# Patient Record
Sex: FEMALE | Race: BLACK OR AFRICAN AMERICAN | NOT HISPANIC OR LATINO | Employment: FULL TIME | URBAN - NONMETROPOLITAN AREA
[De-identification: names, ages, dates, MRNs, and addresses within clinical notes are randomized per-mention and may not be internally consistent; named-entity substitution may affect disease eponyms.]

---

## 2021-11-03 ENCOUNTER — NON-PROVIDER VISIT (OUTPATIENT)
Dept: URGENT CARE | Facility: PHYSICIAN GROUP | Age: 21
End: 2021-11-03

## 2021-11-03 DIAGNOSIS — Z11.1 ENCOUNTER FOR PPD TEST: ICD-10-CM

## 2021-11-03 NOTE — NON-PROVIDER
Emani Rodriguez is a 21 y.o. female here for a non-provider visit for PPD placement -- Step 1 of 2    Reason for PPD:  work requirement    1. TB evaluation questionnaire completed by patient? Yes      -  If any answers marked yes did you contact a provider prior to placing? No  2.  Patient notified to return to clinic for reading on: 11/5-11/6  3.  PPD Placement documentation completed on TB evaluation questionnaire? Yes  4.  Location of TB evaluation questionnaire filed:

## 2021-11-06 ENCOUNTER — NON-PROVIDER VISIT (OUTPATIENT)
Dept: URGENT CARE | Facility: PHYSICIAN GROUP | Age: 21
End: 2021-11-06

## 2021-11-06 NOTE — NON-PROVIDER
Emani Rodriguez is a 21 y.o. female here for a non-provider visit for PPD reading -- Step 1 of 2.      1.  Resulted in Epic under enter/edit results? Yes   2.  TB evaluation questionnaire scanned into chart and original given to patient?Yes      3. Was induration greater than 0 mm? No.    If Step 1 of 2, when is patient returning for second step (delete if N/A): yes      Routed to PCP? No

## 2021-11-12 ENCOUNTER — NON-PROVIDER VISIT (OUTPATIENT)
Dept: URGENT CARE | Facility: PHYSICIAN GROUP | Age: 21
End: 2021-11-12

## 2021-11-13 NOTE — NON-PROVIDER
Emani Rodriguez is a 21 y.o. female here for a non-provider visit for PPD placement -- Step 2 of 2    Reason for PPD:  work requirement    1. TB evaluation questionnaire completed by patient? Yes      -  If any answers marked yes did you contact a provider prior to placing? No  2.  Patient notified to return to clinic for reading on: 11/15/2021  3.  PPD Placement documentation completed on TB evaluation questionnaire? Yes  4.  Location of TB evaluation questionnaire filed: folder

## 2021-11-15 ENCOUNTER — NON-PROVIDER VISIT (OUTPATIENT)
Dept: URGENT CARE | Facility: PHYSICIAN GROUP | Age: 21
End: 2021-11-15

## 2021-11-15 NOTE — NON-PROVIDER
Emani Rodriguez is a 21 y.o. female here for a non-provider visit for PPD reading -- Step 2 of 2.      1.  Resulted in Epic under enter/edit results? Yes   2.  TB evaluation questionnaire scanned into chart and original given to patient?Yes      3. Was induration greater than 0 mm? No.    If Step 1 of 2, when is patient returning for second step (delete if N/A): n/a     Routed to PCP? No

## 2023-09-08 ENCOUNTER — OFFICE VISIT (OUTPATIENT)
Dept: MEDICAL GROUP | Facility: CLINIC | Age: 23
End: 2023-09-08
Payer: MEDICAID

## 2023-09-08 VITALS
BODY MASS INDEX: 30.82 KG/M2 | WEIGHT: 185 LBS | HEIGHT: 65 IN | TEMPERATURE: 97.6 F | SYSTOLIC BLOOD PRESSURE: 120 MMHG | OXYGEN SATURATION: 97 % | DIASTOLIC BLOOD PRESSURE: 74 MMHG | HEART RATE: 88 BPM

## 2023-09-08 DIAGNOSIS — Z34.91 PREGNANT AND NOT YET DELIVERED IN FIRST TRIMESTER: ICD-10-CM

## 2023-09-08 DIAGNOSIS — Z32.00 POSSIBLE PREGNANCY: Primary | ICD-10-CM

## 2023-09-08 LAB
POCT INT CON NEG: NEGATIVE
POCT INT CON POS: POSITIVE
POCT URINE PREGNANCY TEST: POSITIVE

## 2023-09-08 PROCEDURE — 3078F DIAST BP <80 MM HG: CPT

## 2023-09-08 PROCEDURE — 81025 URINE PREGNANCY TEST: CPT

## 2023-09-08 PROCEDURE — 99213 OFFICE O/P EST LOW 20 MIN: CPT | Mod: GE

## 2023-09-08 PROCEDURE — 3074F SYST BP LT 130 MM HG: CPT

## 2023-09-08 ASSESSMENT — PATIENT HEALTH QUESTIONNAIRE - PHQ9: CLINICAL INTERPRETATION OF PHQ2 SCORE: 0

## 2023-09-20 ENCOUNTER — APPOINTMENT (OUTPATIENT)
Dept: RADIOLOGY | Facility: IMAGING CENTER | Age: 23
End: 2023-09-20
Payer: MEDICAID

## 2023-09-20 DIAGNOSIS — Z3A.10 10 WEEKS GESTATION OF PREGNANCY: ICD-10-CM

## 2023-09-20 PROCEDURE — 76801 OB US < 14 WKS SINGLE FETUS: CPT | Mod: TC

## 2023-09-25 PROBLEM — Z32.00 POSSIBLE PREGNANCY: Status: ACTIVE | Noted: 2023-09-25

## 2023-09-25 NOTE — ASSESSMENT & PLAN NOTE
- Bedside ultrasound unable to visualize gestational sac. No transvaginal capability in office.  - Discussed with patient that 5-6 weeks gestation is very difficult to visualize pregnancy  - Pt's bladder empty    - Prenatal panel ordered  - OBGYN referral placed. However, pt counseled she does not need referral to OBGYN and she should call offices she wants to go to.  - Counseled on prenatal vitamins, precautions for n/v, and avoiding ETOH, drugs, deli meats, undercooked meats, unpasteurized cheese, raw fish.

## 2023-09-25 NOTE — PROGRESS NOTES
"Subjective:     CC: Possible pregnancy    HPI:   Emani presents today with    Problem   Possible Pregnancy    - Outpatient and hospital b-hcg positive.  - LMP first week of August         No current Epic-ordered outpatient medications on file.     No current Epic-ordered facility-administered medications on file.       ROS:  As above      Objective:     Exam:  /74   Pulse 88   Temp 36.4 °C (97.6 °F)   Ht 1.651 m (5' 5\")   Wt 83.9 kg (185 lb)   SpO2 97%   BMI 30.79 kg/m²  Body mass index is 30.79 kg/m².    Gen: Alert and oriented, No apparent distress.  HEENT: PERRL, EOMI, NCAT, uvula midline, no exudates  Neck: Neck is supple without lymphadenopathy.  Lungs: Normal effort, CTA bilaterally, no wheezes, rhonchi, or rales  CV: Regular rate and rhythm. No murmurs, rubs, or gallops.  Abd:  Soft, Non-distended, non-tender  Ext: No clubbing, cyanosis, edema.  Skin: No rashes, no erythema      Labs: Ordered    Assessment & Plan:     23 y.o. female with the following:     Problem List Items Addressed This Visit       Possible pregnancy - Primary     - Bedside ultrasound unable to visualize gestational sac. No transvaginal capability in office.  - Discussed with patient that 5-6 weeks gestation is very difficult to visualize pregnancy  - Pt's bladder empty    - Prenatal panel ordered  - OBGYN referral placed. However, pt counseled she does not need referral to OBGYN and she should call offices she wants to go to.  - Counseled on prenatal vitamins, precautions for n/v, and avoiding ETOH, drugs, deli meats, undercooked meats, unpasteurized cheese, raw fish.         Relevant Orders    POCT Pregnancy (Completed)    PREG CNTR PRENATAL PN    HEP C VIRUS ANTIBODY     Other Visit Diagnoses       Pregnant and not yet delivered in first trimester        Relevant Orders    PREG CNTR PRENATAL PN    HEP C VIRUS ANTIBODY    Referral to OB/Gyn                No follow-ups on file.            "

## 2024-02-22 ENCOUNTER — HOSPITAL ENCOUNTER (EMERGENCY)
Facility: MEDICAL CENTER | Age: 24
End: 2024-02-22
Attending: OBSTETRICS & GYNECOLOGY | Admitting: OBSTETRICS & GYNECOLOGY
Payer: MEDICAID

## 2024-02-22 VITALS
WEIGHT: 200 LBS | BODY MASS INDEX: 32.14 KG/M2 | RESPIRATION RATE: 16 BRPM | SYSTOLIC BLOOD PRESSURE: 115 MMHG | HEIGHT: 66 IN | DIASTOLIC BLOOD PRESSURE: 68 MMHG | TEMPERATURE: 97.1 F | OXYGEN SATURATION: 97 % | HEART RATE: 80 BPM

## 2024-02-22 LAB
ALBUMIN SERPL BCP-MCNC: 3.7 G/DL (ref 3.2–4.9)
ALBUMIN/GLOB SERPL: 1.2 G/DL
ALP SERPL-CCNC: 81 U/L (ref 30–99)
ALT SERPL-CCNC: 16 U/L (ref 2–50)
ANION GAP SERPL CALC-SCNC: 14 MMOL/L (ref 7–16)
APPEARANCE UR: CLEAR
AST SERPL-CCNC: 14 U/L (ref 12–45)
BASOPHILS # BLD AUTO: 0.3 % (ref 0–1.8)
BASOPHILS # BLD: 0.03 K/UL (ref 0–0.12)
BILIRUB SERPL-MCNC: 0.3 MG/DL (ref 0.1–1.5)
BUN SERPL-MCNC: 5 MG/DL (ref 8–22)
CALCIUM ALBUM COR SERPL-MCNC: 9.2 MG/DL (ref 8.5–10.5)
CALCIUM SERPL-MCNC: 9 MG/DL (ref 8.5–10.5)
CHLORIDE SERPL-SCNC: 102 MMOL/L (ref 96–112)
CO2 SERPL-SCNC: 20 MMOL/L (ref 20–33)
COLOR UR AUTO: YELLOW
CREAT SERPL-MCNC: 0.36 MG/DL (ref 0.5–1.4)
CREAT UR-MCNC: 47.29 MG/DL
EOSINOPHIL # BLD AUTO: 0.12 K/UL (ref 0–0.51)
EOSINOPHIL NFR BLD: 1.1 % (ref 0–6.9)
ERYTHROCYTE [DISTWIDTH] IN BLOOD BY AUTOMATED COUNT: 39.1 FL (ref 35.9–50)
GFR SERPLBLD CREATININE-BSD FMLA CKD-EPI: 143 ML/MIN/1.73 M 2
GLOBULIN SER CALC-MCNC: 3.1 G/DL (ref 1.9–3.5)
GLUCOSE SERPL-MCNC: 72 MG/DL (ref 65–99)
GLUCOSE UR QL STRIP.AUTO: NEGATIVE MG/DL
HCT VFR BLD AUTO: 36.2 % (ref 37–47)
HGB BLD-MCNC: 12.3 G/DL (ref 12–16)
IMM GRANULOCYTES # BLD AUTO: 0.08 K/UL (ref 0–0.11)
IMM GRANULOCYTES NFR BLD AUTO: 0.7 % (ref 0–0.9)
KETONES UR QL STRIP.AUTO: NEGATIVE MG/DL
LEUKOCYTE ESTERASE UR QL STRIP.AUTO: NEGATIVE
LYMPHOCYTES # BLD AUTO: 2.19 K/UL (ref 1–4.8)
LYMPHOCYTES NFR BLD: 19.9 % (ref 22–41)
MCH RBC QN AUTO: 27.7 PG (ref 27–33)
MCHC RBC AUTO-ENTMCNC: 34 G/DL (ref 32.2–35.5)
MCV RBC AUTO: 81.5 FL (ref 81.4–97.8)
MONOCYTES # BLD AUTO: 0.58 K/UL (ref 0–0.85)
MONOCYTES NFR BLD AUTO: 5.3 % (ref 0–13.4)
NEUTROPHILS # BLD AUTO: 7.99 K/UL (ref 1.82–7.42)
NEUTROPHILS NFR BLD: 72.7 % (ref 44–72)
NITRITE UR QL STRIP.AUTO: NEGATIVE
NRBC # BLD AUTO: 0 K/UL
NRBC BLD-RTO: 0 /100 WBC (ref 0–0.2)
PH UR STRIP.AUTO: 7 [PH] (ref 5–8)
PLATELET # BLD AUTO: 299 K/UL (ref 164–446)
PMV BLD AUTO: 11.4 FL (ref 9–12.9)
POTASSIUM SERPL-SCNC: 3.7 MMOL/L (ref 3.6–5.5)
PROT SERPL-MCNC: 6.8 G/DL (ref 6–8.2)
PROT UR QL STRIP: NEGATIVE MG/DL
PROT UR-MCNC: 7 MG/DL (ref 0–15)
PROT/CREAT UR: 148 MG/G (ref 10–107)
RBC # BLD AUTO: 4.44 M/UL (ref 4.2–5.4)
RBC UR QL AUTO: NEGATIVE
SODIUM SERPL-SCNC: 136 MMOL/L (ref 135–145)
SP GR UR STRIP.AUTO: 1.01 (ref 1–1.03)
WBC # BLD AUTO: 11 K/UL (ref 4.8–10.8)

## 2024-02-22 PROCEDURE — 85025 COMPLETE CBC W/AUTO DIFF WBC: CPT

## 2024-02-22 PROCEDURE — 84156 ASSAY OF PROTEIN URINE: CPT

## 2024-02-22 PROCEDURE — 99283 EMERGENCY DEPT VISIT LOW MDM: CPT

## 2024-02-22 PROCEDURE — 81002 URINALYSIS NONAUTO W/O SCOPE: CPT

## 2024-02-22 PROCEDURE — 59025 FETAL NON-STRESS TEST: CPT

## 2024-02-22 PROCEDURE — 82570 ASSAY OF URINE CREATININE: CPT

## 2024-02-22 PROCEDURE — 80053 COMPREHEN METABOLIC PANEL: CPT

## 2024-02-22 PROCEDURE — 36415 COLL VENOUS BLD VENIPUNCTURE: CPT

## 2024-02-22 ASSESSMENT — PAIN SCALES - GENERAL: PAINLEVEL: 0 - NO PAIN

## 2024-02-22 NOTE — ED PROVIDER NOTES
Obstetrics and Gynecology  Obstetric Emergency Department Assessment Note    ID: 24 y.o. is a  with IUP at 29wks    Primary Obstetrician: Rebecca Heller M.D.    Assessing Obstetrician: Caroline Yancey MD    S: Pt sent from office for PIH workup due to elevated blood pressure of 130s/90s. She has had a couple elevated blood pressures in the past as well. Denies hx of HTN prior to pregnancy. No HA, RUQ pain, or visual changes. No swelling. No CTX, VB, or LOF. Baby moving well. Her pregnancy has recently been complicated by IUGR. She is seeing [facility] for this.    ROS: 10 systems negative except as noted above.    O:   Vitals:    24 1201 24 1216 24 1231 24 1246   BP: 113/69 121/76 135/75 121/65   Pulse: 85 100 89 82   Resp:       Temp:       TempSrc:       SpO2:       Weight:       Height:           Gen: NAD, AAO  HEENT: NC/AT, MMM  Neck: supple  Abd: Gravid, soft, uterus NTTP, no rebound/guarding  Ext: WWP, 2+ DPP, no edema  Skin: no rash  Neuro: no gross deficits, EOMI, no clonus  Psy: mood good, affect normal and congruent  Pelvic: SVE deferred    NST Report Review:  NST: 140s to 150s, pos accels, neg decels, moderate variability, reactive and category 1  [place]: none    Recent Labs     24  1203   WBC 11.0*   RBC 4.44   HEMOGLOBIN 12.3   HEMATOCRIT 36.2*   MCV 81.5   MCH 27.7   RDW 39.1   PLATELETCT 299   MPV 11.4   NEUTSPOLYS 72.70*   LYMPHOCYTES 19.90*   MONOCYTES 5.30   EOSINOPHILS 1.10   BASOPHILS 0.30     Recent Labs     24  1203   SODIUM 136   POTASSIUM 3.7   CHLORIDE 102   CO2 20   GLUCOSE 72   BUN 5*     Recent Labs     24  1203   ALBUMIN 3.7   TBILIRUBIN 0.3   ALKPHOSPHAT 81   TOTPROTEIN 6.8   ALTSGPT 16   ASTSGOT 14   CREATININE 0.36*      Latest Reference Range & Units 24 11:49   Creatinine, Random Urine mg/dL 47.29   Total Protein, Urine 0.0 - 15.0 mg/dL 7.0   Protein Creatinine Ratio 10 - 107 mg/g 148 (H)   (H): Data is abnormally high      Assessment:    Emani Rodriguez is a 24 y.o.  at 29wks  Gestational HTN - no evidence of severe PIH at this time, no proteinuria, PIH labs all WNL, asymptomatic, all BP WNL in triage.  Reassuring, reactive NST.  Fetal IUGR    Plan:  Pt discharged to home.  FKC/labor precautions  PIH precautions  F/u with Dr. Heller and [facility] as scheduled.  Check BP daily, log and bring to appts.  Return prn concerns.      Caroline Yancey M.D., 2024, 11:32 AM

## 2024-02-22 NOTE — PROGRESS NOTES
Patient is a  with EDC of 2024 at 29 weeks and 6 days who presents to L&D after being sent over from office for PIH work up. External monitors applied, VS obtained. Dr. Bravo at bedside. Orders received from Dr. Bravo. Patient reports +FM, denies LOF, VB and UC. Patient denies complications with pregnancy.   1300: Report to SHNAON Small.  1330: Report from SHANON Small.  1343: Orders received from Dr. Bravo to discharge patient.  1350: Discharge education discussed and work note provided to patient. Patient verbalized understanding, denies questions and concerns at this time. Patient ambulated off unit in stable condition with her significant other. Care relinquished.

## 2024-02-28 ENCOUNTER — HOSPITAL ENCOUNTER (EMERGENCY)
Facility: MEDICAL CENTER | Age: 24
End: 2024-02-28
Attending: OBSTETRICS & GYNECOLOGY | Admitting: OBSTETRICS & GYNECOLOGY
Payer: MEDICAID

## 2024-02-28 VITALS
HEIGHT: 66 IN | DIASTOLIC BLOOD PRESSURE: 72 MMHG | OXYGEN SATURATION: 97 % | TEMPERATURE: 97.2 F | HEART RATE: 85 BPM | SYSTOLIC BLOOD PRESSURE: 113 MMHG | WEIGHT: 205 LBS | BODY MASS INDEX: 32.95 KG/M2 | RESPIRATION RATE: 16 BRPM

## 2024-02-28 LAB
APPEARANCE UR: CLEAR
COLOR UR AUTO: YELLOW
GLUCOSE UR QL STRIP.AUTO: NEGATIVE MG/DL
KETONES UR QL STRIP.AUTO: NEGATIVE MG/DL
LEUKOCYTE ESTERASE UR QL STRIP.AUTO: NEGATIVE
NITRITE UR QL STRIP.AUTO: NEGATIVE
PH UR STRIP.AUTO: 6.5 [PH] (ref 5–8)
PROT UR QL STRIP: NEGATIVE MG/DL
RBC UR QL AUTO: ABNORMAL
SP GR UR STRIP.AUTO: 1.02 (ref 1–1.03)

## 2024-02-28 PROCEDURE — 59025 FETAL NON-STRESS TEST: CPT

## 2024-02-28 PROCEDURE — 302449 STATCHG TRIAGE ONLY (STATISTIC)

## 2024-02-28 PROCEDURE — 81002 URINALYSIS NONAUTO W/O SCOPE: CPT

## 2024-02-28 ASSESSMENT — FIBROSIS 4 INDEX: FIB4 SCORE: 0.3

## 2024-02-29 NOTE — PROGRESS NOTES
24 y.o.  EDC  (30.5 wks)    Pt presents to L&D c/o two elevated Bps while at work. Pt states her pressures then were 140s/90s. Pt states seeing some floaters shortly earlier today but denies any vision changes now. Denies any HA or epigastric pain. Reports good FM. Denies VB/LOF.  EFM/TOCO applied, VSS.    : Dr Muñoz notified of pt's arrival and current BP's. Discharge home orders received after reactive NST.    : Patient discharged home with specific instruction to return to L&D/Physician ie.. Bleeding/ROM/decreased FM/labor/concerns for self or baby.  Patient denies questions or concerns regarding POC since arrival and POC to discharge home.  Patient ambulated out of hospital.

## 2024-05-16 ENCOUNTER — OFFICE VISIT (OUTPATIENT)
Dept: OBGYN | Facility: CLINIC | Age: 24
End: 2024-05-16
Payer: MEDICAID

## 2024-05-16 DIAGNOSIS — O92.79 LACTATION DISORDER, POSTPARTUM CONDITION: ICD-10-CM

## 2024-05-16 PROCEDURE — 99215 OFFICE O/P EST HI 40 MIN: CPT | Performed by: NURSE PRACTITIONER

## 2024-05-16 NOTE — PROGRESS NOTES
Summary: 37.1 weeks and IUGR, delivery at [facility], supplementation and pumping started but pumping painful.  Figured out the flanges, switched to her [employer] personal and [employer] Stride wearable and making sufficient milk based on growth.  She did find she needed to use 4oz of formula each of the last two days. Takes 3-4oz at a feeding.  Would like to breastfeed and tried recently, he stayed latched but it hurt the whole 30 minutes he was on the one breast. Has a firmer left breast with discomfort under the left breast, feeling full.  Fed 70ml before driving to appointment.  Today: Assisted latch to the left breast, awkward cross cradle so moved to football position, finally got it without pain and he  for 12 minutes removing 1.5oz. To the right breast, football again and removed another 1.6oz + as he had a blow out on the blanket. Pumping not indicated, leaving it for the after the next feeding.  Plan: Practice breastfeeding in the day, you will struggle sometimes, so just stop and feed a bottle then pump, you are both learning.Each day you will be breastfeeding more frequently and then when it feels you are ready you can introduce breastfeeding at night. He will probably want to feed more often as he may not be taking as much milk each time.  All pumped milk is for him within the next 24 hours, not freezing any at this time.  Follow up:   Lactation appointment: Group Encouraged and 2 weeks  Baby 's Provider appointment: 2 Month Well Child Check   Referrals: None    Maternal Diagnosis/Problem:  Lactation Disorder- Baby not latching   Infant Diagnosis/Problem:   difficulties feeding at the breast     Subjective:     Emani Rodriguez is a 24 y.o. female here for lactation care. She is here today with her baby.    Concerns:   Maternal: Latch on difficulties , Feeling that there is not enough milk , Weight check, Infant feeding evaluation, and Breastfeeding questions   Infant: Baby not interested    HPI:    Pertinent  history:  37.1weeks    Mother does not have a history of advanced maternal age, GDM, hypertension prior to pregnancy, insulin resistance, multiple gestation, PCOS, thyroid disease, auto immune disease , placenta encapsulation, and breast surgery    Mother does have GHTN. Common condition(s) that may interfere in milk supply.    Breast changes in pregnancy: Yes  Breast surgeries: No    FEEDING HISTORY:    Previous Breastfeeding History: First baby.   Hospital Course: 37.1 weeks and IUGR, delivery at [facility], supplementation and pumping started but pumping painful.    Currently 2024: Figured out the flanges, switched to her Lansin[employer] personal and amanda Stride wearable and making sufficient milk based on growth.  She did find she needed to use 4oz of formula each of the last two days. Takes 3-4oz at a feeding.  Would like to breastfeed and tried recently, he stayed latched but it hurt the whole 30 minutes he was on the one breast. Has a firmer left breast with discomfort under the left breast, feeling full.  Fed 70ml before driving to appointment.     Both breasts: Tries    Supplement: Supplementation initiated inpatient, Expressed breast milk, and Formula    Breast Pumping:   Frequency: 6-8x/day  Quantity Obtained: varies  Type of Pump: Wearable Amanda stride and Lansinoh  Flange size/type:adjusted  NO pain with pumping    Maternal ROS:  Constitutional: No fever, chills. Feeling well  Breasts: No soreness of nipples and Soreness of breasts on the way to appointment  Psychiatric: No mood changes  Mental Health: No mention of feeling irritable, agitated, angry, overwhelmed, apathetic, appetite changes, exhausted nor having sleep changes outside infant feeds/demands.  No current outpatient medications on file prior to visit.     No current facility-administered medications on file prior to visit.      No past medical history on file.      Objective:     Maternal Physical Lactation  Exam  General: No acute distress  Breasts: Symmetrical , Soft, Plugged Duct/inflammation - below the left breast, no redness no warmth after feeding not totally emptied , and Mastitis  - no S/S  Nipples: intact  Psychiatric: Normal mood and affect. Her behavior is normal. Judgment and thought content normal.  Mental Health: Did NOT exhibit sadness, crying, feeling overwhelmed, agitation or hypervigilance.    Assessment/Plan & Lactation Counseling:     Infant Exam on Infant Chart    Infant Weight History:   4/13/24   4#12oz  4/17/24  4#14.1oz  5/8/24   7#2.5oz  5/16/2024  7#14.0oz    Infant Intake at Breast:      Total: 3.1oz +  Milk Transfer at this feeding:   Effective breastfeeding   Pumped: Not indicated   Initiation of Feeding: Infant initiates  Attachment Achieved: with difficulty  Nipple shield: N/A       Difficult Latch Due To:   Position and latch  Learning after 5 weeks of bottles only but he is a fast learner like his mom  Suck Pattern at the Breast: Suck burst and normal rest  Suck Pattern on the Bottle: Not Indicated   Behavior Following Observed Feeding: content  Nipple Pain: None     Latch: Assisted latch  Suckling/Feeding: attaches, baby fed effectively, baby roots, elicits DAVI, and rhythmic  Sucking strength: Moderate Strong  Sucking Rhythm Coordinated   Compression: WNL    Once latched, baby fell into a mature and fully integrated SSB pattern.    Swallowing No difficulty noted  If functional feeding, it is quiet, rhythmic, coordinated, organized, effeicent safe, satisfying and pleasurable for both parent and baby? learning  Milk Supply Available: normal    MATERNAL PERSONALIZED BREASTFEEDING PLAN  (Babies and parents change and adapt  or mal-adapt, to each other, so a plan today is only as good as it facilitates the families goals, follow up is key in a dynamic process as breastfeeding.)  Discussed concerns and symptoms as listed above in assessment and guidance summarized below. Shared decision  making was used between myself and the family for this encounter, home care, and follow up. Topics advised, taught and or reviewed included:   Breast Care: Plugs (a colloquial term for microscopic ductal inflammation and narrowing) Breast rest - No Massage,   KEY: Ice - 10 minutes  after feeding then every 30 minutes or as desired for repeatinthe ice. Don't put the ice directly on the skin.  Advil 800mg every 8 hours for 48 hours with food for pain  May add Tylenol 1000mg every 8 hours for 48 hours in between the Advil dosing if needed for more pain relief.  4th Trimester: The 12-week period immediately after mom has had the baby. Not everyone has heard of it, but every mother and their  baby will go through it. It is a time of great physical and emotional change as the baby adjusts to being outside the womb, and the family adjusts to new life as parents  During the fourth trimester, one can expect fussiness and crying from the baby and very likely exhaustion for the family.  babies are learning to adjust to life outside the womb where it was warm and squishy!  There is a lot of misinformation about babies and their needs, and parents are often encouraged to ignore baby's signals. Bad idea. Babies are “half-baked” at birth and have much to learn with the help of physical and emotional support from caregivers. Taking care of a baby's needs is an investment that pays off with a happier, healthier child and adult.  It can take weeks or even months for your body to feel totally normal again. There is a major hormonal upheaval experienced by moms in the first few weeks after birth, because their bodies are shifting from many pregnancy hormones to a more normal hormonal make-up.  These first three months with baby earth side is a delicate time. Honor it with a mindful dose of support. Mindful Mamma's is an lulu that may help.   Self-Compassion through Relational Support and Interaction.   Be kind to yourself.  "This is the first step in reducing anxiety and depression. Pay attention to how you are doing.   What do you need? Food, Rest, Sleep, Support, to Laugh/giggle: who will you ask today? They want to help you. We want to help you.   How do you stop your self-blame, or your self-criticism?   Get out of the house each day, walk or drive somewhere or ask a friend to drive you,  a \"treat\" at a drive through.   Mood and Anxiety Disorders: Having a new baby can be joyful time for some new moms, but a difficult time for others. For all, it is a time of profound physical and emotional change. Balancing baby, family, partners and friends, work, pets, and preexisting or new life stressors as well as sleep deprivation can be extremely challenging. Untreated depression, sleep exhaustion, and anxiety are each a challenge that must be addressed and in addition can contribute to early cessation of breastfeeding. It is important both for the mental health and physical health of new moms and babies to get on the path to feeling better and if therapeutic support is needed, specific resources are available.   Milk Supply is dependent on glandular tissue development, hormonal influences, how many times the baby removes milk and how well the breasts are emptied in a 24 hour period. This is a biological reality that we can NOT work around. If, for any reason, your baby is not latching, or you are not able to nurse, then it is important for you to remove the milk instead by pumping or hand expression.  There's no magic trick, tea, food, drink, cookie or supplement that will increase your milk supply. One must effectively remove milk to continue to make and maximize milk. In the early days and weeks that can be 8+ times in 24 hours. For older babies, on average 6-7 + times in 24 hours.    Hydration: Staying hydrated is important however lack of hydration is usually not a cause of significantly low milk production.  Everyone " needs a different amount of water, depending on their activity and diet. A high salt and/or high-protein diet, high physical activity, or very warm weather/sweating will require more fluids. A person eating a diet high in veggies and fruit, with a lack of physical activity will require fewer fluids. There is no magic number for the # of ounces of water each day.The best way to know that you are well hydrated is by looking at your urine.  Urine should look clear to light pale yellow, and you should need to pee at least every 3 to 4 hours unless you have a large bladder capacity.  Herbs and Medications: A galactagogue is an herb or medication taken by a breastfeeding mother to increase her milk supply. We know that for centuries mothers around the world have sought out remedies to increase their milk supply. However, there is limited research on the safety and effectiveness of herbal galactagogues, which makes it hard for us to endorse them. It is not known if any of these herbs are truly effective, and it is difficult to predict how a specific herbal galactagogue will affect an individual, requiring “trial and error” in many situations. When effective, results are generally seen within 24-72 hours of starting an herbal galactagogue. Many of these herbs are used to decrease high blood sugar. If you are diabetic or have problems with low blood sugar, or thyroid disease you may not be a candidate for herbs. Not all women can increase their low milk supply with a galactagogue due to the many underlying causes of low milk production.  When taking a galactagogue, remember that frequent milk removal is still the most effective way to increase supply.   Feeding:   Infant feeding well given current interval growth, guidelines to follow:  Feed your baby every 1.5-3 hours, more often if baby acts hungry.   8-12x/24hours, these will be irregular intervals  Responsive feedings - baby cues and parents respond  Awaken baby for  feeding if going over 3 hours in the day.    Given infants weight you may allow baby to go longer at night but that generally means shorter durations in the day.  Supplement:   No supplement is needed unless when not at the breast, mom  pumps and feeds  Pacifier Use:  The American Academy of Pediatrics' Position Paper reports: Although we recommend a conservative approach regarding pacifier use, we do not endorse a complete ban on the use of pacifiers, nor do we support an approach that induces parental guilt concerning their choices about the use of pacifiers. Pacifier use and breastfeeding in term and  newborns- a systematic review and meta-analysis from the  J of Pediatrics Published online 2022. Has found that when pacifiers are used among individuals who have been counseled on the risks, do not interfere with breastfeeding exclusivity or duration. This is a  parental choice. https://www.TestObject.com/watch?v=QspJS-4wqPc (One way to choose a pacifier)  Positioning Techniques for Bare Breast  Pillow used: Corrine 2 [place]  Suggested positions Cross cradle, Football, and Side lying  Fine tune position by making sure your fingers beneath the breast as well as your bra, are out of the way of your baby's chin.  Positioning: See http://globalhealthmedia.org/portfolio-items/positions-for-breastfeeding/?ptxtpalykUO=39081  Latch on Techniques for Bare Breast.   Aim is an asymmetric deep latch.  First make yourself comfortable. Sit with knees bent (unless lying down), feet on a stool if needed. You will support your baby, and pillows will be used to support YOU. You want your baby's belly facing your breast/body (belly to belly). If your baby is extremely fussy and crying, do skin-to-skin for a while until he or she calms down, then try (or try again).   Fine tune latch:  By holding your baby more securely at the breast, assisting your baby to stay attached by:  Support your baby with your hand  "behind the shoulder blades (NOT THE HEAD).  1. Align your baby's NOSE with your nipple  2. Your baby's chin should be the first part of his or her body to touch your breast  3. Tickle the baby's upper lip or nose with your nipple  4. When your baby's mouth is WIDE OPEN, swiftly bring your baby's mouth over your nipple/areola.  Steps 2 and 3 could be slightly reversed order or essentially happening at the same time.  Bringing your baby to your breast, not breast to the baby  Your baby's cheek to touch breast securely, nose tipped back  Hold your baby firmly in place so when your baby forgets to suck and picks it back up again your baby is in the correct spot. You will be extinguishing behavior and replacing it with a deeper latch to stimulate suck and provide satisfaction at the breast.  Your baby needs as much breast as deep in the mouth as possible to allow your nipples to heal and for you more importantly to maximize efficiency at the breast  If that doesn’t help, you should make an appointment with me to re-evaluate.   Latch is asymmetrical, leading with the chin, getting the baby below the breast, as if offering a large \"deli ronda sandwich\".  Here is a video from KULWANT on the asymmetric latch       https://www.youCloudHelixube.com/watch?v=0I-FDe7Jj01  Pumping Guidelines:  Both breasts at night until starting breastfeeding  In the daytime after every other breastfeeding or instead of breastfeeding.  Type of Pump:  Personal and Amanda stride  Power Pumping is rarely indicated as the response is not significant or zero over 24 hours  How long will vary woman to woman, typically 8-15 minutes, or 1-2 minutes after flow slows. Too long is dry pumping and creates inflammation  Flange Fit: Freely moving nipple in the tunnel with some movement of the areola. Careful using lubricant it will disguise pain if you increase suction  Caution with Breast Massage: The word “Massage” means different things in the breastfeeding world. It " is described and interpreted in so many different ways and unfortunately potentially harmful. The hands can be safe on a breast and  gentle to help in many ways but they also can be damaging when used in the wrong way.  Lymphatic drainage massage is appropriate,  open hand , lightly stroking only, and can be highly effective. The massage advice to knead , massage deeply , vibrate with marketed tools is  most concerning. Be gentle with this gland to not increase inflammation.   Storage (Acceptable guidelines for healthy term babies)  10 hours at room temp including your pieces, may rinse but not mandatory  4 days refrigerator, don't need  to refrigerate right away if using fresh pumped milk at the next feeding  Freezer 1 year  Deep freezer 2 years  American Academy or Pediatrics has said you may mix different temperature milks.   If baby drinks breast milk from a fresh or refrigerated bottle of breast milk,  you may return the unused portion to the refrigerator and use once at next feeding.   Answers to FAQs: https://www.infantrisk.com/category/breastfeeding  Alcohol & Breastfeeding: What's your time-to-zero?  Cough & Cold Medications while Breastfeeding  Vitamin D Supplementation and Breastfeeding  Antidepressant Use While Breastfeeding: What should I know?  Breastfeeding, Caffeine, and Energy Drinks  Recommended resources:  Physicians guide to breastfeeding, must up to date and accurate information available on breastfeeding. https://physicianguidetobreastfeeding.org/    Spoiler alert!  Parenting can be really hard. There is so much to learn when it comes to caring for small humans.  It can feel lonely and unsettling.  Our groups, are parenting and feeding support groups that's all about feeding/growing josiah.   Babies welcome.   Questions expected.   We will help you find your village!   Weight Checks  Breastfeeding [place] [facility]IVE  WEIGHT CHECKS  Tuesdays 10am - 11am. Women's Health at [facility], [address],  3rd floor conference room  Check your baby's weight, do a feeding and see how your baby is growing, visit with other mothers, plan on a walk or coffee date after group.  Please download the lulu: Growth: Baby and Child for Apple or Child Growth Tracker for Androids to chart and follow your baby's growth curve.  Due to space limitations - limit strollers please (New c/section moms please use your stroller).  We would love to have dads stay, but moms won't breastfeed if there are men in the room, sorry.  The room is generally scheduled for another event following group.  Please take all diapers with you   Online Breastfeeding [place] Online Group  Thursdays, 12 noon - 1 pm Facilitated by Everett MILLARD  Zoom Link: [email or web address]   Passcode [id number]   [id number]  Connect with other mothers:  Facebook:   [place] Breastfeeds: [email or web address]  Well-Nourished Babies (Private group for questions and support): [email or web address]  Online Breastfeeding [place] Online Group  Thursdays, 12 noon - 1 pm Facilitated by Eevrett MILLARD  Zoom Link: [email or web address]   Passcode [id number]   [id number]  Breastfeeding [facility] including opportunity to weight your baby and do before and after weights   Tuesdays 10am - 11am. Women's Health at [address] [place] [address], [address] [place] [address], 3rd floor conference room  Check your baby's weight, do a feeding and see how your baby is growing, visit with other mothers, plan on a walk or coffee date after group.  Please download Growth: Baby and Child for Apple or Child Growth Tracker for Androids if you would like to  document and follow your baby's growth curve.  Due to space limitations - limit strollers please (New c/section moms please use your stroller).  We would love to have dads stay, but moms won't  breastfeed if there are men in the room, sorry.  The room is generally scheduled for another event following group.  Please take all diapers with you   PSI ONLINE SUPPORT GROUPS  Postpartum [employer] International (PSI) support groups are conducted using a qgak-ny-kofo support model and are not intended for those experiencing a mental health crisis. Groups are 90 minutes (1.5 hours) in length. The first ~30 minutes is providing information, education, and establishing group guidelines. The next ~60 minutes is “talk time,” in which group members share and talk with each other. Group members must be present for the group guidelines before joining in the discussion or “talk time.”     Position, Latch and Pumping discussed and plan provided. (Documented on moms chart).     Infant Exam Summary:    Healthy 33 day old.  Anticipatory guidance was provided regarding feedings.   Weight  good interval growth:  Created a plan to meet family's breastfeeding goals.  Patient learning to breastfeed and needs practice now and opportunity      Contact Breastfeeding Medicine    or your Pediatrician for any of the following:   Decreased wet or poopy diapers  Decreased feeding  Baby not waking up for feeds on own most of time.   Irritability  Lethargy  Dry sticky mouth.   Any breastfeeding questions or concerns.    In Conclusion:   Managing breastfeeding and milk supply is very dynamic,can be a complex and intimate journey, and is not one size fits all. When obstacles present themselves, it takes confidence, persistence and support. The rights of the child include optimal nutrition and mothers need help to make informed decisions. You  and your baby have been screened for biological, psychological, and social risk factors that might interfere with achieving your goals.  Support is critical. We want the family thriving not just tolerating life. You are now the focus of our Breastfeeding Medicine team; we are here to support your decisions  and vision.     Follow up   Please call [phone] our voicemail line or the front office at [phone] to scheduled your next appointment.  Family is encouraged to e-mail (for Everett, may use [email or web address]) or [email or web address]  to update how the plan is working.     A total of 60 minutes, not including infant assessment time,  was spent preparing to see the patient, obtaining and reviewing separately obtained history, performing a medically appropriate examination and evaluation, counseling and educating the family, communicating with other health care professionals, documenting clinical information in the electronic health record, independently interpreting weighted feeds and infant growth results, communicating these results to the family and care coordination as detailed in the above note.       NEREIDA Luevano.

## 2025-02-25 ENCOUNTER — OFFICE VISIT (OUTPATIENT)
Dept: URGENT CARE | Facility: CLINIC | Age: 25
End: 2025-02-25
Payer: MEDICAID

## 2025-02-25 ENCOUNTER — APPOINTMENT (OUTPATIENT)
Dept: LAB | Facility: MEDICAL CENTER | Age: 25
End: 2025-02-25
Payer: MEDICAID

## 2025-02-25 VITALS
BODY MASS INDEX: 32.14 KG/M2 | WEIGHT: 200 LBS | DIASTOLIC BLOOD PRESSURE: 62 MMHG | HEIGHT: 66 IN | RESPIRATION RATE: 12 BRPM | SYSTOLIC BLOOD PRESSURE: 102 MMHG | TEMPERATURE: 98.4 F | OXYGEN SATURATION: 97 % | HEART RATE: 108 BPM

## 2025-02-25 DIAGNOSIS — B96.89 BACTERIAL URI: ICD-10-CM

## 2025-02-25 DIAGNOSIS — J06.9 BACTERIAL URI: ICD-10-CM

## 2025-02-25 PROCEDURE — 99213 OFFICE O/P EST LOW 20 MIN: CPT

## 2025-02-25 RX ORDER — DOXYCYCLINE HYCLATE 100 MG
100 TABLET ORAL 2 TIMES DAILY
Qty: 10 TABLET | Refills: 0 | Status: SHIPPED | OUTPATIENT
Start: 2025-02-25 | End: 2025-03-02

## 2025-02-25 RX ORDER — PREDNISONE 20 MG/1
40 TABLET ORAL DAILY
Qty: 10 TABLET | Refills: 0 | Status: SHIPPED | OUTPATIENT
Start: 2025-02-25 | End: 2025-03-02

## 2025-02-25 ASSESSMENT — ENCOUNTER SYMPTOMS
WHEEZING: 0
SORE THROAT: 0
HEADACHES: 1
CHILLS: 0
WEAKNESS: 0
DIARRHEA: 0
DIZZINESS: 0
SHORTNESS OF BREATH: 1
FEVER: 0
HEMOPTYSIS: 0
COUGH: 1
DOUBLE VISION: 0
BLURRED VISION: 0
MYALGIAS: 0
SPUTUM PRODUCTION: 1
PALPITATIONS: 0
NAUSEA: 0
TINGLING: 0
VOMITING: 0

## 2025-02-25 ASSESSMENT — FIBROSIS 4 INDEX: FIB4 SCORE: 0.32

## 2025-02-25 NOTE — PROGRESS NOTES
Subjective:   Emani Rodriguez is a 25 y.o. female who presents for Headache (Loss of voice.  SOB, concerns on Nuva ring placement)    Patient presents to the clinic for complaints of cough causing hoarse voice, SOB w/ exertion, fatigue x 2 weeks.  Cough has been intermittently productive.   Has taken OTC cough/cold medications and tylenol.   Patient denies fever, chills, SOB at rest or w/ minimal activity, weakness, N/V/D, or dizziness/lightheadedness.     Headache      Review of Systems   Constitutional:  Positive for malaise/fatigue. Negative for chills and fever.   HENT:  Negative for congestion, ear pain and sore throat.    Eyes:  Negative for blurred vision and double vision.   Respiratory:  Positive for cough, sputum production and shortness of breath (w/ exertion). Negative for hemoptysis and wheezing.    Cardiovascular:  Negative for chest pain and palpitations.   Gastrointestinal:  Negative for diarrhea, nausea and vomiting.   Genitourinary:  Negative for dysuria.   Musculoskeletal:  Negative for myalgias.   Neurological:  Positive for headaches. Negative for dizziness, tingling and weakness.   All other systems reviewed and are negative.    Refer to HPI for additional details.    During this visit, appropriate PPE was worn, and hand hygiene was performed.    PMH:  has no past medical history on file.    MEDS:   Current Outpatient Medications:     predniSONE (DELTASONE) 20 MG Tab, Take 2 Tablets by mouth every day for 5 days., Disp: 10 Tablet, Rfl: 0    doxycycline (VIBRAMYCIN) 100 MG Tab, Take 1 Tablet by mouth 2 times a day for 5 days., Disp: 10 Tablet, Rfl: 0    ALLERGIES:   Allergies   Allergen Reactions    Shellfish-Derived Products      SURGHX: History reviewed. No pertinent surgical history.  SOCHX:  reports that she has never smoked. She has never used smokeless tobacco.    FH: Per HPI as applicable/pertinent.    Medications, Allergies, and current problem list reviewed today in Epic.     Objective:  "    /62 (BP Location: Left arm, Patient Position: Sitting, BP Cuff Size: Large adult)   Pulse (!) 108   Temp 36.9 °C (98.4 °F) (Temporal)   Resp 12   Ht 1.676 m (5' 6\")   Wt 90.7 kg (200 lb)   SpO2 97%     Physical Exam  Constitutional:       Appearance: Normal appearance. She is not ill-appearing or toxic-appearing.   HENT:      Head: Normocephalic.      Right Ear: Tympanic membrane, ear canal and external ear normal.      Left Ear: Tympanic membrane, ear canal and external ear normal.      Nose: Nose normal. No congestion or rhinorrhea.      Mouth/Throat:      Mouth: Mucous membranes are moist.      Pharynx: Oropharynx is clear. No oropharyngeal exudate or posterior oropharyngeal erythema.   Eyes:      General:         Right eye: No discharge.         Left eye: No discharge.      Extraocular Movements: Extraocular movements intact.      Conjunctiva/sclera: Conjunctivae normal.      Pupils: Pupils are equal, round, and reactive to light.   Cardiovascular:      Rate and Rhythm: Normal rate and regular rhythm.      Pulses: Normal pulses.      Heart sounds: Normal heart sounds. No murmur heard.  Pulmonary:      Effort: Pulmonary effort is normal. No respiratory distress.      Breath sounds: Normal breath sounds. No wheezing or rhonchi.   Abdominal:      General: Abdomen is flat.   Musculoskeletal:         General: No signs of injury. Normal range of motion.      Cervical back: Normal range of motion. No rigidity.   Lymphadenopathy:      Cervical: No cervical adenopathy.   Skin:     General: Skin is warm and dry.   Neurological:      General: No focal deficit present.      Mental Status: She is alert and oriented to person, place, and time.      Motor: No weakness.         Assessment/Plan:     Diagnosis and associated orders:     1. Bacterial URI  - predniSONE (DELTASONE) 20 MG Tab; Take 2 Tablets by mouth every day for 5 days.  Dispense: 10 Tablet; Refill: 0  - doxycycline (VIBRAMYCIN) 100 MG Tab; Take 1 " Tablet by mouth 2 times a day for 5 days.  Dispense: 10 Tablet; Refill: 0     Comments/MDM:     Discussed that likely etiology of the patient's symptoms is acute bacterial upper respiratory infection.  Prednisone 5-day course and doxycycline 5-day course prescribed to the patient. Discussed proper administration and dosing as well as associated adverse/side effects of prescribed medications.  Advised patient to continue OTC pharmacologic and nonpharmacologic treatment of her symptoms, including but not limited to Tylenol, Motrin, OTC cough and cold medications, and plenty of rest and fluids.  Low likelihood for pneumonia as patient does not have fever, continuously productive cough, hypoxia, abnormalities in lung fields on auscultation, or any increase in respiratory labor or effort with respirations.  Patient agreeable to this plan of care.  All questions answered.  Return to clinic if symptoms persist or worsen.  Red flag symptoms warranting emergency medical services discussed, including but not limited to chest pain, SOB, wheezing, difficulty breathing or swallowing, sensation of throat closing or mass in the throat, severe intractable headache, changes to vision or hearing, palpitations or racing heart rate, abdominal pain, fever greater than 103F despite medication management, dizziness/lightheadedness/vertigo, or nausea/vomiting/diarrhea.           Differential diagnosis, natural history, supportive care, and indications for immediate follow-up discussed.    Advised the patient to follow-up with the primary care physician for recheck, reevaluation, and consideration of further management.    Instructed patient to seek emergency medical attention via calling EMS or going to the Emergency Room for red flag symptoms, including but not limited to: chest pain, palpitations, fever greater than 103F, shortness of breath, wheezing, new or worsened numbness/tingling, focal or unilateral weakness, and bloody  vomit/stool.     Please note that this dictation was created using voice recognition software. I have made a reasonable attempt to correct obvious errors, but I expect that there are errors of grammar and possibly content that I did not discover before finalizing the note.    This note was electronically signed by OSCAR Marcus

## 2025-04-08 ENCOUNTER — OFFICE VISIT (OUTPATIENT)
Dept: URGENT CARE | Facility: CLINIC | Age: 25
End: 2025-04-08
Payer: MEDICAID

## 2025-04-08 VITALS
OXYGEN SATURATION: 99 % | DIASTOLIC BLOOD PRESSURE: 78 MMHG | WEIGHT: 293 LBS | HEIGHT: 66 IN | HEART RATE: 107 BPM | TEMPERATURE: 97 F | RESPIRATION RATE: 18 BRPM | BODY MASS INDEX: 47.09 KG/M2 | SYSTOLIC BLOOD PRESSURE: 124 MMHG

## 2025-04-08 DIAGNOSIS — U07.1 INFECTION DUE TO 2019-NCOV: Primary | ICD-10-CM

## 2025-04-08 DIAGNOSIS — R68.89 FLU-LIKE SYMPTOMS: ICD-10-CM

## 2025-04-08 DIAGNOSIS — R05.1 ACUTE COUGH: ICD-10-CM

## 2025-04-08 LAB
FLUAV RNA SPEC QL NAA+PROBE: NEGATIVE
FLUBV RNA SPEC QL NAA+PROBE: NEGATIVE
RSV RNA SPEC QL NAA+PROBE: NEGATIVE
SARS-COV-2 RNA RESP QL NAA+PROBE: POSITIVE

## 2025-04-08 PROCEDURE — 99214 OFFICE O/P EST MOD 30 MIN: CPT

## 2025-04-08 PROCEDURE — 3074F SYST BP LT 130 MM HG: CPT

## 2025-04-08 PROCEDURE — 87637 SARSCOV2&INF A&B&RSV AMP PRB: CPT | Mod: QW

## 2025-04-08 PROCEDURE — 3078F DIAST BP <80 MM HG: CPT

## 2025-04-08 RX ORDER — ETONOGESTREL AND ETHINYL ESTRADIOL VAGINAL .015; .12 MG/D; MG/D
RING VAGINAL
COMMUNITY
Start: 2025-02-11

## 2025-04-08 RX ORDER — HYDROXYZINE HYDROCHLORIDE 10 MG/1
10 TABLET, FILM COATED ORAL 3 TIMES DAILY PRN
COMMUNITY

## 2025-04-08 RX ORDER — BENZONATATE 100 MG/1
100 CAPSULE ORAL 3 TIMES DAILY PRN
Qty: 60 CAPSULE | Refills: 0 | Status: SHIPPED | OUTPATIENT
Start: 2025-04-08

## 2025-04-08 ASSESSMENT — ENCOUNTER SYMPTOMS
NAUSEA: 0
WHEEZING: 0
HEMOPTYSIS: 0
SORE THROAT: 1
HEARTBURN: 0
MYALGIAS: 1
SPUTUM PRODUCTION: 1
DIZZINESS: 0
DIAPHORESIS: 0
PHOTOPHOBIA: 0
DOUBLE VISION: 0
CHILLS: 1
HEADACHES: 1
COUGH: 1
PALPITATIONS: 0
EYE DISCHARGE: 0
DIARRHEA: 0
FEVER: 1
EYE REDNESS: 0
ABDOMINAL PAIN: 0
VOMITING: 0
BRUISES/BLEEDS EASILY: 0
SHORTNESS OF BREATH: 0
BLURRED VISION: 0
DEPRESSION: 0
FOCAL WEAKNESS: 0
STRIDOR: 0
EYE PAIN: 0

## 2025-04-08 ASSESSMENT — FIBROSIS 4 INDEX: FIB4 SCORE: 0.32

## 2025-04-08 NOTE — LETTER
April 8, 2025         Patient: Emani Rodriguez   YOB: 2000   Date of Visit: 4/8/2025           To Whom it May Concern:    Emani Rodriguez was seen in my clinic on 4/8/2025. She may return to work/school on 04/13/2025 providing they no longer have a fever for 24 hours without the use of fever lowering medications and their symptoms are improving.   .    If you have any questions or concerns, please don't hesitate to call.        Sincerely,           NEREIDA Car.  Electronically Signed

## 2025-04-08 NOTE — LETTER
4/8/2025               Emani Rodriguez  [id number]  [place] [address]        Dear Emani (MR#[id number]),    This letter is to inform you that your COVID-19 test result is POSITIVE.  This means that the virus that causes COVID-19 was found in your sample.      SARS-CoV-2 by PCR   Date Value Ref Range Status   04/08/2025 Positive (A) Negative, Invalid Final       If your symptoms are generally mild and stable, please isolate yourself at home. If it becomes difficult to breathe, contact your healthcare provider as soon as possible.    Next steps:  -  Stay home except to get medical care.  -  Follow the instructions for home isolation below.  -  Call ahead before visiting your healthcare provider or a hospital.  -  Go to the nearest emergency department for worsening symptoms including difficulty breathing, ongoing fever, weakness or chest pain.    You should isolate yourself:  -  For a minimum of 5 days from symptom onset or positive test and  -  At least 24 hours have passed since your last fever without the use of fever-reducing medications and  -  All other symptoms have improved (loss of taste and smell may persist for weeks or months after recovery and should not delay the end of isolation)    Instructions for Self-Isolation at Home:  -  We recommend you stay in your home and minimize contact with others to avoid spreading this infection.  -  Do not go to work, school or public areas unless otherwise directed by the health department. Avoid using public transportation, ridesharing or taxis.  -  Separate yourself from other people in your home as much as possible.  -  Stay in a specific room and away from other people in your home as much as possible. Use a separate bathroom if possible.        When seeking care at a healthcare facility:  -  Seek prompt medical attention if your illness is worsening (e.g., difficulty breathing).  -  When possible, call the healthcare provider before arriving.  -  Put  on a facemask before you enter the facility.  -  If possible, put on a facemask before the ambulance or paramedics arrive.  -  These steps will help the healthcare provider's office prevent other people from getting infected or exposed.    Once any symptoms have resolved, it may be possible to donate plasma to help others that are currently ill with COVID-19. To learn more and apply, please contact the  at [phone] or via e-mail at [email or web address].    For any further questions regarding COVID-19, please contact your [facility]'s health department or healthcare provider.        Sincerely,    [facility] Care Team

## 2025-04-08 NOTE — PROGRESS NOTES
Subjective:   Emani Rodriguez is a 25 y.o. female who presents for Other (Covid esposure x 1.5 days, worse yesterday, was feeling warm, diarrhea x1.5 days, weak/body aches x 1 day,  on and off SOB )          I introduced myself to the patient and informed them that I am a Family Nurse Practitioner.    HPI: Gallo is a 25-year-old female who comes in today c/o chills, cough, nasal congestion, runny nose, malaise, body aches,  diarrhea,  pharyngitis.  Onset was 1 days ago. Patient describes symptoms as constant. They describe the pain as headache, body aches,  sharp and scratchy throat. Aggravating factors include worse at night, cough is worse when they lay down, throat pain is exacerbated by coughing. Relieving factors include none. Treatments tried at home include  OTC Cold and Flu medicine with poor effect.  They describe their symptoms as moderate. Denies any known exposure to Covid, Flu, RSV, strep.  States her 11-month-old son has been sick for about 5 days with similar symptoms, he does go to , possible exposure there. States they did get a flu shot this season, states vaccinated against Covid x 1.       Review of Systems   Constitutional:  Positive for chills, fever and malaise/fatigue. Negative for diaphoresis.   HENT:  Positive for congestion and sore throat. Negative for ear discharge, ear pain, hearing loss and tinnitus.    Eyes:  Negative for blurred vision, double vision, photophobia, pain, discharge and redness.   Respiratory:  Positive for cough and sputum production. Negative for hemoptysis, shortness of breath, wheezing and stridor.    Cardiovascular:  Negative for chest pain, palpitations and leg swelling.   Gastrointestinal:  Negative for abdominal pain, diarrhea, heartburn, nausea and vomiting.   Genitourinary:  Negative for dysuria.   Musculoskeletal:  Positive for myalgias.   Skin:  Negative for rash.   Neurological:  Positive for headaches. Negative for dizziness and focal weakness.  "  Endo/Heme/Allergies:  Does not bruise/bleed easily.   Psychiatric/Behavioral:  Negative for depression.    All other systems reviewed and are negative.      Medications: DAYQUIL MULTI-SYMPTOM COLD/FLU PO  En[place]Ring Ring  hydrOXYzine HCl Tabs  sertraline Tabs     Allergies: Shellfish-derived products    Problem List: does not have any pertinent problems on file.    Surgical History:  No past surgical history on file.    Past Social Hx:   reports that she has never smoked. She has never used smokeless tobacco. She reports that she does not currently use alcohol. She reports that she does not use drugs.     Past Family Hx:   family history is not on file.     Problem list, medications, and allergies reviewed by myself today in Epic.   I have documented what I find to be significant in regards to past medical, social, family and surgical history  in my HPI or under PMH/PSH/FH review section, otherwise it is noncontributory     Objective:     /78 (BP Location: Left arm, Patient Position: Sitting, BP Cuff Size: Adult)   Pulse (!) 107   Temp 36.1 °C (97 °F) (Temporal)   Resp 18   Ht 1.676 m (5' 6\")   Wt (!) 135 kg (297 lb)   SpO2 99%   Breastfeeding No   BMI 47.94 kg/m²     During this visit, appropriate PPE was worn, and hand hygiene was performed.    Physical Exam  Vitals reviewed.   Constitutional:       General: She is not in acute distress.     Appearance: Normal appearance. She is not toxic-appearing or diaphoretic.   HENT:      Head: Normocephalic and atraumatic.      Right Ear: Tympanic membrane, ear canal and external ear normal. There is no impacted cerumen.      Left Ear: Tympanic membrane, ear canal and external ear normal. There is no impacted cerumen.      Nose: Congestion and rhinorrhea present.      Mouth/Throat:      Mouth: Mucous membranes are moist.      Pharynx: Posterior oropharyngeal erythema present. No oropharyngeal exudate.   Eyes:      General: No scleral icterus.        Right " eye: No discharge.         Left eye: No discharge.      Extraocular Movements: Extraocular movements intact.      Conjunctiva/sclera: Conjunctivae normal.      Pupils: Pupils are equal, round, and reactive to light.   Cardiovascular:      Rate and Rhythm: Regular rhythm. Tachycardia present.      Heart sounds: Normal heart sounds. No murmur heard.     No friction rub. No gallop.   Pulmonary:      Effort: No respiratory distress.      Breath sounds: Normal breath sounds. No stridor. No wheezing, rhonchi or rales.   Chest:      Chest wall: No tenderness.   Abdominal:      General: Bowel sounds are normal. There is no distension.      Palpations: Abdomen is soft.   Genitourinary:     Comments: Deferred  Musculoskeletal:         General: Normal range of motion.      Cervical back: Normal range of motion. No rigidity or tenderness.   Lymphadenopathy:      Cervical: No cervical adenopathy.   Skin:     General: Skin is warm and dry.      Capillary Refill: Capillary refill takes 2 to 3 seconds.      Coloration: Skin is not jaundiced or pale.   Neurological:      General: No focal deficit present.      Mental Status: She is alert and oriented to person, place, and time. Mental status is at baseline.   Psychiatric:         Mood and Affect: Mood normal.         Behavior: Behavior normal.         Thought Content: Thought content normal.         Judgment: Judgment normal.         Lab Results/POC Test Results   Results for orders placed or performed in visit on 04/08/25   POCT CoV-2, Flu A/B, RSV by PCR    Collection Time: 04/08/25  1:23 PM   Result Value Ref Range    SARS-CoV-2 by PCR Positive (A) Negative, Invalid    Influenza virus A RNA Negative Negative, Invalid    Influenza virus B, PCR Negative Negative, Invalid    RSV, PCR Negative Negative, Invalid           Assessment/Plan:     Diagnosis and associated orders:     1. Infection due to 2019-nCoV        2. Flu-like symptoms  POCT CoV-2, Flu A/B, RSV by PCR      3. Acute  cough  benzonatate (TESSALON) 100 MG Cap         Comments/MDM:     1. Flu-like symptoms  - POCT CoV-2, Flu A/B, RSV by PCR    2. Acute cough  - benzonatate (TESSALON) 100 MG Cap; Take 1 Capsule by mouth 3 times a day as needed for Cough.  Dispense: 60 Capsule; Refill: 0    3. Infection due to 2019-nCoV (Primary)   I did discuss with the patient that their viral panel testing in clinic today was positive for COVID-19 infection.  Patient currently with mild to moderate symptoms, no evidence of hypoxia or end-organ dysfunction and no evidence of  more serious Covid pneumonia.  We discussed current CDC guidelines from isolating and masking. We did discuss antiviral treatment, indications for, risks, and possible side effects, patient states they do not want to be prescribed antiviral therapy.    We discussed viral versus bacterial infection, and I informed patient that Covid-19 is a viral URI and antibiotics are not an effective treatment for this. I instructed them that the management for this is supportive care-we discussed the following measures -   cough/deep breathing exercises, drink plenty of fluids, get plenty of rest, try a humidifier in bedroom at night to help them breathe easier while asleep, gargle with salt water/honey to help ease sore throat.    I instr patient they may use OTC cold and flu meds to treat symptoms - caution regarding checking ingredients as some meds contain tylenol; may use tylenol alternated with ibuprofen (unless allergic or contraindicated) for pain/fever - may take tylenol 1000mg every 6 hours, do not exceed 3000 mg in 24 hours; ibuprofen start with lowest effective dose, 200mg every 8 hours, may increase to up to 400 mg every 8 hours if needed -  studies have shown that there is no added therapeutic effect with doses greater than 400 mg.  Patient was instructed that they should feel better in 7-10 days, (but some viral illnesses can last 2 weeks or longer, with residual cough  possible for over a month) but to return to clinic if symptoms do not improve or worsen after 10-14 days.   We did discuss red flags and when to return to urgent care versus when to go to the emergency room, and strict ER precautions were given.   Patient was encouraged to get a pharmacy consult when picking up any medication's.   I did print written instructions for patient, and did go over the diagnosis including differentials, and side effects of each medication with them and answer their questions to the best of my ability.   Patient states they have good understanding of instructions, and are agreeable with the plan of care.           Pt is clinically stable at today's acute urgent care visit. Vital signs are normal and reassuring.  No acute distress noted. Appropriate for outpatient management at this time.        I personally reviewed prior external notes and test results pertinent to today's visit.  I have independently reviewed and interpreted all diagnostics ordered during this urgent care acute visit.        Please note that this dictation was created using voice recognition software. I have made a reasonable attempt to correct obvious errors, but I expect that there are errors of grammar and possibly content that I did not discover before finalizing the note.    This note was electronically signed by Leonidas MILLARD, CELIO, SADIE, TANIA